# Patient Record
Sex: FEMALE | Race: ASIAN | URBAN - METROPOLITAN AREA
[De-identification: names, ages, dates, MRNs, and addresses within clinical notes are randomized per-mention and may not be internally consistent; named-entity substitution may affect disease eponyms.]

---

## 2021-05-11 ASSESSMENT — ANXIETY QUESTIONNAIRES
6. BECOMING EASILY ANNOYED OR IRRITABLE: SEVERAL DAYS
GAD7 TOTAL SCORE: 3
7. FEELING AFRAID AS IF SOMETHING AWFUL MIGHT HAPPEN: NOT AT ALL
GAD7 TOTAL SCORE: 3
1. FEELING NERVOUS, ANXIOUS, OR ON EDGE: SEVERAL DAYS
7. FEELING AFRAID AS IF SOMETHING AWFUL MIGHT HAPPEN: NOT AT ALL
4. TROUBLE RELAXING: NOT AT ALL
5. BEING SO RESTLESS THAT IT IS HARD TO SIT STILL: NOT AT ALL
3. WORRYING TOO MUCH ABOUT DIFFERENT THINGS: SEVERAL DAYS
2. NOT BEING ABLE TO STOP OR CONTROL WORRYING: NOT AT ALL

## 2021-05-11 ASSESSMENT — ENCOUNTER SYMPTOMS
INSOMNIA: 0
PANIC: 0
DEPRESSION: 1
DECREASED CONCENTRATION: 0
NERVOUS/ANXIOUS: 1

## 2021-05-12 ASSESSMENT — ANXIETY QUESTIONNAIRES: GAD7 TOTAL SCORE: 3

## 2021-05-13 NOTE — PROGRESS NOTES
Progress Note    SUBJECTIVE:  Ginny Rice is an 21 year old female   who requests an Annual Preventive Exam.   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 States safe in relationship, uses Nexplanon for contraception. Happy with method, this is her second Nexplanon. Recently had STI screen which was negative. Desires pap today.     Concerns today include: No new health concerns, first pap screen.   History of mental health issues including some depressive symptoms. Admits to suicidal ideation, but has no plan or intention to hurt self. States has thoughts and then they pass quickly, reaches out to support people like her partner. Has utilized therapy in the past which has been helpful. States she knows where to find resources.     Menstrual History:  Menstrual History 2021   Menarche Age 13   Period Cycle (Days) no period on implant   Method of Contraception Implanon/nexplanon     No results found for: PAP  History of abnormal Pap smear: No, first pap today     Mammogram current: NA     Last Colonoscopy: NA      HISTORY:  etonogestrel (NEXPLANON) 68 MG IMPL, 1 each    No current facility-administered medications on file prior to visit.     No Known Allergies    There is no immunization history on file for this patient.    OB History    Para Term  AB Living   0 0 0 0 0 0   SAB TAB Ectopic Multiple Live Births   0 0 0 0 0     History reviewed. No pertinent past medical history.  History reviewed. No pertinent surgical history.  Family History   Problem Relation Age of Onset     Diabetes Father      Liver Cancer Maternal Grandfather      Hyperlipidemia Paternal Grandmother      Social History     Socioeconomic History     Marital status: Single     Spouse name: Not on file     Number of children: Not on file     Years of education: Not on file     Highest education level: Not on  "file   Occupational History     Not on file   Social Needs     Financial resource strain: Not on file     Food insecurity     Worry: Not on file     Inability: Not on file     Transportation needs     Medical: Not on file     Non-medical: Not on file   Tobacco Use     Smoking status: Not on file   Substance and Sexual Activity     Alcohol use: Not on file     Drug use: Not on file     Sexual activity: Not on file   Lifestyle     Physical activity     Days per week: Not on file     Minutes per session: Not on file     Stress: Not on file   Relationships     Social connections     Talks on phone: Not on file     Gets together: Not on file     Attends Zoroastrian service: Not on file     Active member of club or organization: Not on file     Attends meetings of clubs or organizations: Not on file     Relationship status: Not on file     Intimate partner violence     Fear of current or ex partner: Not on file     Emotionally abused: Not on file     Physically abused: Not on file     Forced sexual activity: Not on file   Other Topics Concern     Not on file   Social History Narrative     Not on file       ROS  PHQ-9 SCORE 5/14/2021   PHQ-9 Total Score 3     CHAVEZ-7 SCORE 5/11/2021   Total Score 3 (minimal anxiety)   Total Score 3         EXAM:  Blood pressure 118/77, pulse 75, height 1.626 m (5' 4\"), weight 52.7 kg (116 lb 3.2 oz). Body mass index is 19.95 kg/m .  General - well nourished female in no acute distress.  Skin - no suspicious lesions or rashes  EENT-  PERRLA, euthyroid with out palpable nodules  Neck - supple without lymphadenopathy.  Lungs - clear to auscultation bilaterally.  Heart - regular rate and rhythm without murmur.  Abdomen - soft, nontender, nondistended, no masses or organomegaly noted. Normal bowel sounds.   Musculoskeletal - no gross deformities.  Neurological - normal strength, sensation, and mental status.    Breast Exam:  Breast: Without visible skin changes. No dimpling or lesions seen.   " Breasts supple, non-tender with palpation, no dominant mass, nodularity, or nipple discharge noted bilaterally. Axillary nodes negative.      Pelvic Exam:  EG/BUS: Normal genital architecture without lesions, erythema or abnormal secretions Bartholin's, Urethra, [place]'s normal   Urethral meatus: normal   Urethra: no masses, tenderness, or scarring   Bladder: no masses or tenderness   Vagina: moist, pink, rugae with creamy, white and odorless  secretions  Cervix: Nulliparous,, no lesions and pink, moist, closed, without lesion or CMT  Uterus: Bimanual exam risks and benefits reviewed, deferred at this time   Adnexa: Bimanual exam risks and benefits reviewed, deferred at this time   Rectum:anus normal       ASSESSMENT:  Encounter Diagnosis   Name Primary?     Annual physical exam Yes        PLAN:   Orders Placed This Encounter   Procedures     Pap imaged thin layer screen only - recommended age 21 - 24 years     Orders Placed This Encounter   Medications     etonogestrel (NEXPLANON) 68 MG IMPL     Si each       Additional teaching done at this visit regarding exercise/weight bearing exercise, vitamin D, safer sex practices,sunscreen, mental health resources and support.     Return to clinic in one year.  Follow-up as needed.    Answers for HPI/ROS submitted by the patient on 2021   CHAVEZ 7 TOTAL SCORE: 3

## 2021-05-14 ENCOUNTER — OFFICE VISIT (OUTPATIENT)
Dept: OBGYN | Facility: CLINIC | Age: 21
End: 2021-05-14
Attending: ADVANCED PRACTICE MIDWIFE
Payer: COMMERCIAL

## 2021-05-14 VITALS
DIASTOLIC BLOOD PRESSURE: 77 MMHG | WEIGHT: 116.2 LBS | HEIGHT: 64 IN | BODY MASS INDEX: 19.84 KG/M2 | SYSTOLIC BLOOD PRESSURE: 118 MMHG | HEART RATE: 75 BPM

## 2021-05-14 DIAGNOSIS — Z00.00 ANNUAL PHYSICAL EXAM: Primary | ICD-10-CM

## 2021-05-14 PROCEDURE — 99385 PREV VISIT NEW AGE 18-39: CPT | Performed by: ADVANCED PRACTICE MIDWIFE

## 2021-05-14 PROCEDURE — G0145 SCR C/V CYTO,THINLAYER,RESCR: HCPCS | Performed by: ADVANCED PRACTICE MIDWIFE

## 2021-05-14 PROCEDURE — G0463 HOSPITAL OUTPT CLINIC VISIT: HCPCS

## 2021-05-14 SDOH — HEALTH STABILITY: MENTAL HEALTH: HOW MANY STANDARD DRINKS CONTAINING ALCOHOL DO YOU HAVE ON A TYPICAL DAY?: 1 OR 2

## 2021-05-14 SDOH — ECONOMIC STABILITY: FOOD INSECURITY: WITHIN THE PAST 12 MONTHS, YOU WORRIED THAT YOUR FOOD WOULD RUN OUT BEFORE YOU GOT MONEY TO BUY MORE.: NOT ASKED

## 2021-05-14 SDOH — HEALTH STABILITY: MENTAL HEALTH: HOW OFTEN DO YOU HAVE 6 OR MORE DRINKS ON ONE OCCASION?: NOT ASKED

## 2021-05-14 SDOH — ECONOMIC STABILITY: TRANSPORTATION INSECURITY
IN THE PAST 12 MONTHS, HAS THE LACK OF TRANSPORTATION KEPT YOU FROM MEDICAL APPOINTMENTS OR FROM GETTING MEDICATIONS?: NOT ASKED

## 2021-05-14 SDOH — ECONOMIC STABILITY: INCOME INSECURITY: HOW HARD IS IT FOR YOU TO PAY FOR THE VERY BASICS LIKE FOOD, HOUSING, MEDICAL CARE, AND HEATING?: NOT ASKED

## 2021-05-14 SDOH — HEALTH STABILITY: MENTAL HEALTH: HOW OFTEN DO YOU HAVE A DRINK CONTAINING ALCOHOL?: MONTHLY OR LESS

## 2021-05-14 SDOH — ECONOMIC STABILITY: TRANSPORTATION INSECURITY
IN THE PAST 12 MONTHS, HAS LACK OF TRANSPORTATION KEPT YOU FROM MEETINGS, WORK, OR FROM GETTING THINGS NEEDED FOR DAILY LIVING?: NOT ASKED

## 2021-05-14 SDOH — ECONOMIC STABILITY: FOOD INSECURITY: WITHIN THE PAST 12 MONTHS, THE FOOD YOU BOUGHT JUST DIDN'T LAST AND YOU DIDN'T HAVE MONEY TO GET MORE.: NOT ASKED

## 2021-05-14 ASSESSMENT — PAIN SCALES - GENERAL: PAINLEVEL: NO PAIN (0)

## 2021-05-14 ASSESSMENT — MIFFLIN-ST. JEOR: SCORE: 1277.08

## 2021-05-14 ASSESSMENT — PATIENT HEALTH QUESTIONNAIRE - PHQ9: SUM OF ALL RESPONSES TO PHQ QUESTIONS 1-9: 3

## 2021-05-14 NOTE — LETTER
2021       RE: Ginny Rice  [id number] [address] [place] [id number]     Dear Colleague,    Thank you for referring your patient, Ginny Rice, to the [facility] [place] at [facility]. Please see a copy of my visit note below.      Progress Note    SUBJECTIVE:  Ginny Rice is an 21 year old female   who requests an Annual Preventive Exam.   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 States safe in relationship, uses Nexplanon for contraception. Happy with method, this is her second Nexplanon. Recently had STI screen which was negative. Desires pap today.     Concerns today include: No new health concerns, first pap screen.   History of mental health issues including some depressive symptoms. Admits to suicidal ideation, but has no plan or intention to hurt self. States has thoughts and then they pass quickly, reaches out to support people like her partner. Has utilized therapy in the past which has been helpful. States she knows where to find resources.     Menstrual History:  Menstrual History 2021   Menarche Age 13   Period Cycle (Days) no period on implant   Method of Contraception Implanon/nexplanon     No results found for: PAP  History of abnormal Pap smear: No, first pap today     Mammogram current: NA     Last Colonoscopy: NA      HISTORY:  etonogestrel (NEXPLANON) 68 MG IMPL, 1 each    No current facility-administered medications on file prior to visit.     No Known Allergies    There is no immunization history on file for this patient.    OB History    Para Term  AB Living   0 0 0 0 0 0   SAB TAB Ectopic Multiple Live Births   0 0 0 0 0     History reviewed. No pertinent past medical history.  History reviewed. No pertinent surgical history.  Family History   Problem Relation Age of Onset     Diabetes  "Father      Liver Cancer Maternal Grandfather      Hyperlipidemia Paternal Grandmother      Social History     Socioeconomic History     Marital status: Single     Spouse name: Not on file     Number of children: Not on file     Years of education: Not on file     Highest education level: Not on file   Occupational History     Not on file   Social Needs     Financial resource strain: Not on file     Food insecurity     Worry: Not on file     Inability: Not on file     Transportation needs     Medical: Not on file     Non-medical: Not on file   Tobacco Use     Smoking status: Not on file   Substance and Sexual Activity     Alcohol use: Not on file     Drug use: Not on file     Sexual activity: Not on file   Lifestyle     Physical activity     Days per week: Not on file     Minutes per session: Not on file     Stress: Not on file   Relationships     Social connections     Talks on phone: Not on file     Gets together: Not on file     Attends Buddhist service: Not on file     Active member of club or organization: Not on file     Attends meetings of clubs or organizations: Not on file     Relationship status: Not on file     Intimate partner violence     Fear of current or ex partner: Not on file     Emotionally abused: Not on file     Physically abused: Not on file     Forced sexual activity: Not on file   Other Topics Concern     Not on file   Social History Narrative     Not on file       ROS  PHQ-9 SCORE 5/14/2021   PHQ-9 Total Score 3     CHAVEZ-7 SCORE 5/11/2021   Total Score 3 (minimal anxiety)   Total Score 3         EXAM:  Blood pressure 118/77, pulse 75, height 1.626 m (5' 4\"), weight 52.7 kg (116 lb 3.2 oz). Body mass index is 19.95 kg/m .  General - well nourished female in no acute distress.  Skin - no suspicious lesions or rashes  EENT-  PERRLA, euthyroid with out palpable nodules  Neck - supple without lymphadenopathy.  Lungs - clear to auscultation bilaterally.  Heart - regular rate and rhythm without " murmur.  Abdomen - soft, nontender, nondistended, no masses or organomegaly noted. Normal bowel sounds.   Musculoskeletal - no gross deformities.  Neurological - normal strength, sensation, and mental status.    Breast Exam:  Breast: Without visible skin changes. No dimpling or lesions seen.   Breasts supple, non-tender with palpation, no dominant mass, nodularity, or nipple discharge noted bilaterally. Axillary nodes negative.      Pelvic Exam:  EG/BUS: Normal genital architecture without lesions, erythema or abnormal secretions Bartholin's, Urethra, Henriette's normal   Urethral meatus: normal   Urethra: no masses, tenderness, or scarring   Bladder: no masses or tenderness   Vagina: moist, pink, rugae with creamy, white and odorless  secretions  Cervix: Nulliparous,, no lesions and pink, moist, closed, without lesion or CMT  Uterus: Bimanual exam risks and benefits reviewed, deferred at this time   Adnexa: Bimanual exam risks and benefits reviewed, deferred at this time   Rectum:anus normal       ASSESSMENT:  Encounter Diagnosis   Name Primary?     Annual physical exam Yes        PLAN:   Orders Placed This Encounter   Procedures     Pap imaged thin layer screen only - recommended age 21 - 24 years     Orders Placed This Encounter   Medications     etonogestrel (NEXPLANON) 68 MG IMPL     Si each       Additional teaching done at this visit regarding exercise/weight bearing exercise, vitamin D, safer sex practices,sunscreen, mental health resources and support.     Return to clinic in one year.  Follow-up as needed.    Answers for HPI/ROS submitted by the patient on 2021   CHAVEZ 7 TOTAL SCORE: 3        Again, thank you for allowing me to participate in the care of your patient.      Sincerely,    WILLIS Greenfield CNM

## 2021-05-14 NOTE — LETTER
Date:May 20, 2021      Patient was self referred, no letter generated. Do not send.        [facility] Health Information

## 2021-05-18 LAB
COPATH REPORT: NORMAL
PAP: NORMAL

## 2021-10-02 ENCOUNTER — HEALTH MAINTENANCE LETTER (OUTPATIENT)
Age: 21
End: 2021-10-02

## 2022-07-09 ENCOUNTER — HEALTH MAINTENANCE LETTER (OUTPATIENT)
Age: 22
End: 2022-07-09

## 2022-09-03 ENCOUNTER — HEALTH MAINTENANCE LETTER (OUTPATIENT)
Age: 22
End: 2022-09-03

## 2023-07-22 ENCOUNTER — HEALTH MAINTENANCE LETTER (OUTPATIENT)
Age: 23
End: 2023-07-22

## 2025-03-30 ENCOUNTER — HEALTH MAINTENANCE LETTER (OUTPATIENT)
Age: 25
End: 2025-03-30

## 2025-04-16 ENCOUNTER — OFFICE VISIT (OUTPATIENT)
Dept: FAMILY MEDICINE | Facility: CLINIC | Age: 25
End: 2025-04-16
Payer: COMMERCIAL

## 2025-04-16 VITALS
TEMPERATURE: 97 F | HEIGHT: 64 IN | WEIGHT: 139.08 LBS | SYSTOLIC BLOOD PRESSURE: 107 MMHG | HEART RATE: 71 BPM | BODY MASS INDEX: 23.75 KG/M2 | OXYGEN SATURATION: 95 % | DIASTOLIC BLOOD PRESSURE: 75 MMHG

## 2025-04-16 DIAGNOSIS — Z13.220 SCREENING FOR CHOLESTEROL LEVEL: ICD-10-CM

## 2025-04-16 DIAGNOSIS — Z13.1 SCREENING FOR DIABETES MELLITUS: ICD-10-CM

## 2025-04-16 DIAGNOSIS — E55.9 VITAMIN D DEFICIENCY: ICD-10-CM

## 2025-04-16 DIAGNOSIS — Z11.3 ROUTINE SCREENING FOR STI (SEXUALLY TRANSMITTED INFECTION): ICD-10-CM

## 2025-04-16 DIAGNOSIS — Z76.89 ENCOUNTER TO ESTABLISH CARE: Primary | ICD-10-CM

## 2025-04-16 LAB
C TRACH DNA SPEC QL PROBE+SIG AMP: NEGATIVE
CHOLEST SERPL-MCNC: 184 MG/DL
EST. AVERAGE GLUCOSE BLD GHB EST-MCNC: 103 MG/DL
FASTING STATUS PATIENT QL REPORTED: YES
HBA1C MFR BLD: 5.2 % (ref 0–5.6)
HDLC SERPL-MCNC: 49 MG/DL
LDLC SERPL CALC-MCNC: 102 MG/DL
N GONORRHOEA DNA SPEC QL NAA+PROBE: NEGATIVE
NONHDLC SERPL-MCNC: 135 MG/DL
SPECIMEN TYPE: NORMAL
TRIGL SERPL-MCNC: 164 MG/DL
VIT D+METAB SERPL-MCNC: 23 NG/ML (ref 20–50)

## 2025-04-16 PROCEDURE — 80061 LIPID PANEL: CPT | Performed by: FAMILY MEDICINE

## 2025-04-16 PROCEDURE — 87491 CHLMYD TRACH DNA AMP PROBE: CPT | Performed by: FAMILY MEDICINE

## 2025-04-16 PROCEDURE — 82306 VITAMIN D 25 HYDROXY: CPT | Performed by: FAMILY MEDICINE

## 2025-04-16 NOTE — PROGRESS NOTES
"CC: Establish [facility]      SUBJECTIVE: Ginny is a 24 year old female who comes in to establish care. No concerns today. She had her annual physical in 11/2024.     PMH, PSH, FH, allergies, and medications reviewed and updated in Epic.     SH: No tobacco use. Social alcohol use. No drug use. 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 Nexplanon for contraception. Currently sexually active in a monogamous relationship. Exercises about 2x/week. Nutrition is varied and balanced.       OBJECTIVE:   /75   Pulse 71   Temp 97  F (36.1  C)   Ht 1.638 m (5' 4.49\")   Wt 63.1 kg (139 lb 1.3 oz)   SpO2 95%   BMI 23.51 kg/m    General: Alert and oriented in no acute distress.  Skin: Clear without lesions or rashes.   Lymph: No anterior cervical lymphadenopathy.   Eyes: PERRL. EOMI.   ENT: TMs intact and pearly gray. Oropharynx moist without lesions or exudate. Supple neck.  Neuro: Grossly intact, nonfocal.  Cardio: RRR, normal S1 and S2, without murmurs, rubs, or gallops appreciated.    Resp: CTA bilaterally. Normal respiratory effort.   GI: Soft, NT, ND.  No masses or hepatosplenomegaly appreciated.    MSK: Distal pulses 2+ and symmetric, extremities without deformity, edema.  Psych: Mood and affect appropriate.    ASSESSMENT/PLAN:   Ginny was seen today for [facility].    Diagnoses and all orders for this visit:    Encounter to establish care    Routine screening for STI (sexually transmitted infection)  -     Chlamydia trachomatis/Neisseria gonorrhoeae by PCR- VAGINAL SELF-SWAB; Future  -     Chlamydia trachomatis/Neisseria gonorrhoeae by PCR- VAGINAL SELF-SWAB    Screening for diabetes mellitus  -     Hemoglobin A1c; Future  -     Hemoglobin A1c    Screening for cholesterol level  -     Lipid panel reflex to direct LDL Fasting; Future  -     Lipid panel reflex to direct LDL Fasting    Vitamin D deficiency  -     Vitamin D deficiency screening; Future  -     Vitamin D deficiency screening    Routine STI screening " today.   Pap smear UTD.   Discussed Covid vaccine.  DM and lipid screening today.  History of low vitamin D level. Not currently taking a supplement. Check level today.     Edna Downing MD  Family Medicine

## 2025-04-21 ENCOUNTER — MYC MEDICAL ADVICE (OUTPATIENT)
Dept: FAMILY MEDICINE | Facility: CLINIC | Age: 25
End: 2025-04-21